# Patient Record
(demographics unavailable — no encounter records)

---

## 2025-06-26 NOTE — ASSESSMENT
[FreeTextEntry1] : acute left lateral ankle pain after sustaining an inversion injury while playing pickup basketball 6/14/25. Left ankle sprain. Went to [facility] the same day- was placed in a brace and crutches. Improved alot with rest and ice and advil.  Looking to go back to work.   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

## 2025-06-26 NOTE — ASSESSMENT
[FreeTextEntry1] : acute left lateral ankle pain after sustaining an inversion injury while playing pickup basketball 6/14/25. Left ankle sprain. Went to [facility] the same day- was placed in a brace and crutches. Improved alot with rest and ice and advil.  Looking to go back to work.   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

## 2025-06-26 NOTE — HISTORY OF PRESENT ILLNESS
[6] : 6 [0] : 0 [Dull/Aching] : dull/aching [Localized] : localized [Occasional] : occasional [Rest] : rest [Full time] : Work status: full time [de-identified] : 6/25/25: acute left ankle pain 1.5 weeks after rolling it while playing pickup basketball.  Improved. [] : no [FreeTextEntry1] : left ankle  [FreeTextEntry3] : 6/14/25 [FreeTextEntry5] : Patient states he was playing basketball when he tripped and someone fell on his ankle and patient rolled over his ankle. 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

## 2025-06-26 NOTE — HISTORY OF PRESENT ILLNESS
[6] : 6 [0] : 0 [Dull/Aching] : dull/aching [Localized] : localized [Occasional] : occasional [Rest] : rest [Full time] : Work status: full time [de-identified] : 6/25/25: acute left ankle pain 1.5 weeks after rolling it while playing pickup basketball.  Improved. [] : no [FreeTextEntry1] : left ankle  [FreeTextEntry3] : 6/14/25 [FreeTextEntry5] : Patient states he was playing basketball when he tripped and someone fell on his ankle and patient rolled over his ankle. 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

## 2025-06-26 NOTE — PHYSICAL EXAM
[Left] : left foot and ankle [NL (20)] : dorsiflexion 20 degrees [NL (40)] : plantar flexion 40 degrees [NL 30)] : inversion 30 degrees [5___] : [facility] 5[unfilled]/5 [] : negative squeeze test at foot [2+] : posterior tibialis pulse: 2+ [FreeTextEntry8] : mild

## 2025-06-26 NOTE — PHYSICAL EXAM
[Left] : left foot and ankle [NL (20)] : dorsiflexion 20 degrees [NL (40)] : plantar flexion 40 degrees [NL 30)] : inversion 30 degrees [5___] : [facility] 5[unfilled]/5 [] : negative squeeze test at foot [2+] : posterior tibialis pulse: 2+ [FreeTextEntry8] : mild

## 2025-06-26 NOTE — DISCUSSION/SUMMARY
[de-identified] : Can discontinue aircast  Compression wrapping  May RTW 6/30/25 assuming feeling better as he has improved significantly.  The patient can be weight bearing as tolerated along with any assistive devices that were advised.  The patient should perform a home exercise program as directed. The patient should focus on the body parts affected as discussed above.   The patient's orthopaedic condition warrants consideration of intermittent use of over-the-counter medications such as advil, alleve, tylenol and similar agents.  The patient is aware to use the medications only as directed on the bottle and should contact a physician should they encounter any problems.  Follow up for any recurrent pain.

## 2025-06-26 NOTE — DISCUSSION/SUMMARY
[de-identified] : Can discontinue aircast  Compression wrapping  May RTW 6/30/25 assuming feeling better as he has improved significantly.  The patient can be weight bearing as tolerated along with any assistive devices that were advised.  The patient should perform a home exercise program as directed. The patient should focus on the body parts affected as discussed above.   The patient's orthopaedic condition warrants consideration of intermittent use of over-the-counter medications such as advil, alleve, tylenol and similar agents.  The patient is aware to use the medications only as directed on the bottle and should contact a physician should they encounter any problems.  Follow up for any recurrent pain.